# Patient Record
Sex: FEMALE | Race: WHITE | Employment: OTHER
[De-identification: names, ages, dates, MRNs, and addresses within clinical notes are randomized per-mention and may not be internally consistent; named-entity substitution may affect disease eponyms.]

---

## 2017-03-02 ENCOUNTER — OFFICE VISIT (OUTPATIENT)
Dept: FAMILY MEDICINE CLINIC | Facility: CLINIC | Age: 82
End: 2017-03-02

## 2017-03-02 VITALS
DIASTOLIC BLOOD PRESSURE: 84 MMHG | WEIGHT: 140 LBS | SYSTOLIC BLOOD PRESSURE: 130 MMHG | BODY MASS INDEX: 23.84 KG/M2 | HEART RATE: 72 BPM

## 2017-03-02 DIAGNOSIS — M17.11 PRIMARY OSTEOARTHRITIS OF RIGHT KNEE: Primary | ICD-10-CM

## 2017-03-02 DIAGNOSIS — Z01.818 PREOP EXAMINATION: ICD-10-CM

## 2017-03-02 DIAGNOSIS — J06.9 UPPER RESPIRATORY TRACT INFECTION, UNSPECIFIED TYPE: ICD-10-CM

## 2017-03-02 PROCEDURE — 99214 OFFICE O/P EST MOD 30 MIN: CPT | Performed by: FAMILY MEDICINE

## 2017-03-02 RX ORDER — DIPHENHYDRAMINE HYDROCHLORIDE 25 MG/1
TABLET ORAL DAILY
COMMUNITY
End: 2017-09-28 | Stop reason: ALTCHOICE

## 2017-03-02 RX ORDER — CIPROFLOXACIN 250 MG/1
250 TABLET, FILM COATED ORAL 2 TIMES DAILY
Qty: 20 TABLET | Refills: 0 | Status: SHIPPED | OUTPATIENT
Start: 2017-03-02 | End: 2017-03-12

## 2017-03-31 ENCOUNTER — OFFICE VISIT (OUTPATIENT)
Dept: FAMILY MEDICINE CLINIC | Age: 82
End: 2017-03-31
Payer: COMMERCIAL

## 2017-03-31 VITALS
DIASTOLIC BLOOD PRESSURE: 80 MMHG | WEIGHT: 141 LBS | HEART RATE: 82 BPM | BODY MASS INDEX: 24.01 KG/M2 | SYSTOLIC BLOOD PRESSURE: 120 MMHG

## 2017-03-31 DIAGNOSIS — M17.11 PRIMARY OSTEOARTHRITIS OF RIGHT KNEE: Primary | ICD-10-CM

## 2017-03-31 DIAGNOSIS — Z96.651 HISTORY OF ARTHROPLASTY OF RIGHT KNEE: ICD-10-CM

## 2017-03-31 PROCEDURE — 99213 OFFICE O/P EST LOW 20 MIN: CPT | Performed by: FAMILY MEDICINE

## 2017-03-31 RX ORDER — FAMOTIDINE 20 MG/1
20 TABLET, FILM COATED ORAL 2 TIMES DAILY
COMMUNITY
End: 2017-09-28 | Stop reason: ALTCHOICE

## 2017-03-31 RX ORDER — ERGOCALCIFEROL (VITAMIN D2) 1250 MCG
50000 CAPSULE ORAL WEEKLY
COMMUNITY
End: 2018-05-31 | Stop reason: ALTCHOICE

## 2017-03-31 RX ORDER — FERROUS SULFATE 325(65) MG
325 TABLET ORAL
COMMUNITY
End: 2018-05-31 | Stop reason: ALTCHOICE

## 2017-03-31 RX ORDER — HYDROCODONE BITARTRATE AND ACETAMINOPHEN 5; 325 MG/1; MG/1
1 TABLET ORAL EVERY 6 HOURS PRN
COMMUNITY
End: 2017-09-28 | Stop reason: ALTCHOICE

## 2017-04-03 PROBLEM — Z96.651 HISTORY OF ARTHROPLASTY OF RIGHT KNEE: Status: ACTIVE | Noted: 2017-04-03

## 2017-04-25 RX ORDER — LEVOTHYROXINE SODIUM 0.03 MG/1
25 TABLET ORAL DAILY
Qty: 90 TABLET | Refills: 3 | Status: SHIPPED | OUTPATIENT
Start: 2017-04-25 | End: 2018-02-13 | Stop reason: SDUPTHER

## 2017-05-09 RX ORDER — ALPRAZOLAM 0.25 MG/1
TABLET ORAL
Qty: 30 TABLET | Refills: 5 | Status: SHIPPED | OUTPATIENT
Start: 2017-05-09 | End: 2017-12-12 | Stop reason: SDUPTHER

## 2017-05-10 ENCOUNTER — TELEPHONE (OUTPATIENT)
Dept: FAMILY MEDICINE CLINIC | Age: 82
End: 2017-05-10

## 2017-09-28 ENCOUNTER — OFFICE VISIT (OUTPATIENT)
Dept: FAMILY MEDICINE CLINIC | Age: 82
End: 2017-09-28
Payer: COMMERCIAL

## 2017-09-28 ENCOUNTER — HOSPITAL ENCOUNTER (OUTPATIENT)
Age: 82
Setting detail: SPECIMEN
Discharge: HOME | End: 2017-09-28
Payer: COMMERCIAL

## 2017-09-28 VITALS
HEART RATE: 65 BPM | SYSTOLIC BLOOD PRESSURE: 120 MMHG | OXYGEN SATURATION: 97 % | BODY MASS INDEX: 23.39 KG/M2 | HEIGHT: 64 IN | DIASTOLIC BLOOD PRESSURE: 60 MMHG | WEIGHT: 137 LBS

## 2017-09-28 DIAGNOSIS — Z78.0 MENOPAUSE: Primary | ICD-10-CM

## 2017-09-28 DIAGNOSIS — D48.9 NEOPLASM, UNCERTAIN WHETHER BENIGN OR MALIGNANT: ICD-10-CM

## 2017-09-28 DIAGNOSIS — Z51.81 MEDICATION MONITORING ENCOUNTER: ICD-10-CM

## 2017-09-28 DIAGNOSIS — E03.9 HYPOTHYROIDISM, UNSPECIFIED TYPE: ICD-10-CM

## 2017-09-28 DIAGNOSIS — Z23 IMMUNIZATION DUE: ICD-10-CM

## 2017-09-28 DIAGNOSIS — M85.80 OSTEOPENIA, UNSPECIFIED LOCATION: ICD-10-CM

## 2017-09-28 DIAGNOSIS — E78.2 MIXED HYPERLIPIDEMIA: ICD-10-CM

## 2017-09-28 PROCEDURE — 11100 PR BIOPSY OF SKIN LESION: CPT | Performed by: FAMILY MEDICINE

## 2017-09-28 PROCEDURE — 90662 IIV NO PRSV INCREASED AG IM: CPT | Performed by: FAMILY MEDICINE

## 2017-09-28 PROCEDURE — G0008 ADMIN INFLUENZA VIRUS VAC: HCPCS | Performed by: FAMILY MEDICINE

## 2017-09-28 PROCEDURE — 99214 OFFICE O/P EST MOD 30 MIN: CPT | Performed by: FAMILY MEDICINE

## 2017-09-28 ASSESSMENT — ENCOUNTER SYMPTOMS
EYE ITCHING: 0
PHOTOPHOBIA: 0
VOICE CHANGE: 0
COLOR CHANGE: 1
ABDOMINAL PAIN: 0
COUGH: 0
RHINORRHEA: 0
SHORTNESS OF BREATH: 0
BACK PAIN: 0
WHEEZING: 0
ABDOMINAL DISTENTION: 0
VOMITING: 0
SINUS PRESSURE: 0
SORE THROAT: 0
CONSTIPATION: 0
BLOOD IN STOOL: 0
NAUSEA: 0
EYE PAIN: 0
CHEST TIGHTNESS: 0
FACIAL SWELLING: 0
EYE REDNESS: 0
EYE DISCHARGE: 0
DIARRHEA: 0

## 2017-09-28 ASSESSMENT — PATIENT HEALTH QUESTIONNAIRE - PHQ9
SUM OF ALL RESPONSES TO PHQ9 QUESTIONS 1 & 2: 0
SUM OF ALL RESPONSES TO PHQ QUESTIONS 1-9: 0
2. FEELING DOWN, DEPRESSED OR HOPELESS: 0
1. LITTLE INTEREST OR PLEASURE IN DOING THINGS: 0

## 2017-09-29 LAB
ALBUMIN SERPL-MCNC: 4.3 G/DL
ALP BLD-CCNC: 83 U/L
ALT SERPL-CCNC: 11 U/L
ANION GAP SERPL CALCULATED.3IONS-SCNC: 10 MMOL/L
AST SERPL-CCNC: 18 U/L
BILIRUB SERPL-MCNC: 0.5 MG/DL (ref 0.1–1.4)
BUN BLDV-MCNC: 17 MG/DL
CALCIUM SERPL-MCNC: 9.5 MG/DL
CHLORIDE BLD-SCNC: 104 MMOL/L
CHOLESTEROL, TOTAL: 201 MG/DL
CHOLESTEROL/HDL RATIO: 3
CO2: 27 MMOL/L
CREAT SERPL-MCNC: 0.69 MG/DL
GFR CALCULATED: >60
GLUCOSE BLD-MCNC: 85 MG/DL
HDLC SERPL-MCNC: 67 MG/DL (ref 35–70)
LDL CHOLESTEROL CALCULATED: 102 MG/DL (ref 0–160)
POTASSIUM SERPL-SCNC: 4.1 MMOL/L
SODIUM BLD-SCNC: 141 MMOL/L
TOTAL PROTEIN: 7
TRIGL SERPL-MCNC: 162 MG/DL
TSH SERPL DL<=0.05 MIU/L-ACNC: 1.13 UIU/ML
VLDLC SERPL CALC-MCNC: 32 MG/DL

## 2017-10-02 LAB — DERMATOLOGY PATHOLOGY REPORT: NORMAL

## 2017-10-04 DIAGNOSIS — Z51.81 MEDICATION MONITORING ENCOUNTER: ICD-10-CM

## 2017-10-04 DIAGNOSIS — E03.9 HYPOTHYROIDISM, UNSPECIFIED TYPE: ICD-10-CM

## 2017-10-04 DIAGNOSIS — E78.2 MIXED HYPERLIPIDEMIA: ICD-10-CM

## 2017-10-17 ENCOUNTER — NURSE ONLY (OUTPATIENT)
Dept: FAMILY MEDICINE CLINIC | Age: 82
End: 2017-10-17
Payer: COMMERCIAL

## 2017-10-17 DIAGNOSIS — Z23 IMMUNIZATION DUE: Primary | ICD-10-CM

## 2017-10-17 PROCEDURE — G0009 ADMIN PNEUMOCOCCAL VACCINE: HCPCS | Performed by: FAMILY MEDICINE

## 2017-10-17 PROCEDURE — 90670 PCV13 VACCINE IM: CPT | Performed by: FAMILY MEDICINE

## 2017-10-19 ENCOUNTER — TELEPHONE (OUTPATIENT)
Dept: FAMILY MEDICINE CLINIC | Age: 82
End: 2017-10-19

## 2017-10-20 RX ORDER — IBANDRONATE SODIUM 150 MG/1
150 TABLET, FILM COATED ORAL
Qty: 3 TABLET | Refills: 3 | Status: SHIPPED | OUTPATIENT
Start: 2017-10-20 | End: 2018-05-31 | Stop reason: SINTOL

## 2017-12-12 RX ORDER — ALPRAZOLAM 0.25 MG/1
TABLET ORAL
Qty: 30 TABLET | Refills: 5 | Status: SHIPPED | OUTPATIENT
Start: 2017-12-12 | End: 2018-07-03 | Stop reason: SDUPTHER

## 2018-02-13 RX ORDER — LEVOTHYROXINE SODIUM 0.03 MG/1
25 TABLET ORAL DAILY
Qty: 90 TABLET | Refills: 3 | Status: SHIPPED | OUTPATIENT
Start: 2018-02-13 | End: 2018-02-22 | Stop reason: SDUPTHER

## 2018-02-26 RX ORDER — LEVOTHYROXINE SODIUM 0.03 MG/1
25 TABLET ORAL DAILY
Qty: 90 TABLET | Refills: 3 | Status: SHIPPED | OUTPATIENT
Start: 2018-02-26 | End: 2019-02-20

## 2018-05-31 ENCOUNTER — OFFICE VISIT (OUTPATIENT)
Dept: FAMILY MEDICINE CLINIC | Age: 83
End: 2018-05-31
Payer: COMMERCIAL

## 2018-05-31 VITALS
WEIGHT: 141 LBS | BODY MASS INDEX: 24.2 KG/M2 | OXYGEN SATURATION: 96 % | HEART RATE: 84 BPM | SYSTOLIC BLOOD PRESSURE: 136 MMHG | DIASTOLIC BLOOD PRESSURE: 80 MMHG

## 2018-05-31 DIAGNOSIS — G89.29 CHRONIC RIGHT-SIDED LOW BACK PAIN WITH RIGHT-SIDED SCIATICA: Primary | ICD-10-CM

## 2018-05-31 DIAGNOSIS — M54.41 CHRONIC RIGHT-SIDED LOW BACK PAIN WITH RIGHT-SIDED SCIATICA: Primary | ICD-10-CM

## 2018-05-31 PROCEDURE — 99214 OFFICE O/P EST MOD 30 MIN: CPT | Performed by: FAMILY MEDICINE

## 2018-05-31 RX ORDER — DICLOFENAC SODIUM 75 MG/1
75 TABLET, DELAYED RELEASE ORAL 2 TIMES DAILY WITH MEALS
Qty: 60 TABLET | Refills: 11 | Status: SHIPPED | OUTPATIENT
Start: 2018-05-31 | End: 2020-01-20 | Stop reason: SDUPTHER

## 2018-07-03 ENCOUNTER — OFFICE VISIT (OUTPATIENT)
Dept: FAMILY MEDICINE CLINIC | Age: 83
End: 2018-07-03
Payer: COMMERCIAL

## 2018-07-03 VITALS
HEART RATE: 87 BPM | OXYGEN SATURATION: 97 % | WEIGHT: 143.2 LBS | BODY MASS INDEX: 24.58 KG/M2 | DIASTOLIC BLOOD PRESSURE: 64 MMHG | SYSTOLIC BLOOD PRESSURE: 112 MMHG

## 2018-07-03 DIAGNOSIS — F41.1 GAD (GENERALIZED ANXIETY DISORDER): ICD-10-CM

## 2018-07-03 DIAGNOSIS — I10 ESSENTIAL HYPERTENSION: ICD-10-CM

## 2018-07-03 DIAGNOSIS — G89.29 CHRONIC BILATERAL LOW BACK PAIN WITHOUT SCIATICA: Primary | ICD-10-CM

## 2018-07-03 DIAGNOSIS — Z51.81 MEDICATION MONITORING ENCOUNTER: ICD-10-CM

## 2018-07-03 DIAGNOSIS — M54.50 CHRONIC BILATERAL LOW BACK PAIN WITHOUT SCIATICA: Primary | ICD-10-CM

## 2018-07-03 DIAGNOSIS — E03.1 CONGENITAL HYPOTHYROIDISM WITHOUT GOITER: ICD-10-CM

## 2018-07-03 DIAGNOSIS — E78.2 MIXED HYPERLIPIDEMIA: ICD-10-CM

## 2018-07-03 PROCEDURE — 99214 OFFICE O/P EST MOD 30 MIN: CPT | Performed by: FAMILY MEDICINE

## 2018-07-03 RX ORDER — ALPRAZOLAM 0.25 MG/1
TABLET ORAL
Qty: 90 TABLET | Refills: 1 | Status: SHIPPED | OUTPATIENT
Start: 2018-07-03 | End: 2019-03-08 | Stop reason: SDUPTHER

## 2018-07-03 ASSESSMENT — ENCOUNTER SYMPTOMS
WHEEZING: 0
FACIAL SWELLING: 0
BLOOD IN STOOL: 0
SORE THROAT: 0
CONSTIPATION: 0
EYE DISCHARGE: 0
PHOTOPHOBIA: 0
RHINORRHEA: 0
EYE PAIN: 0
CHEST TIGHTNESS: 0
SHORTNESS OF BREATH: 0
COLOR CHANGE: 0
EYE REDNESS: 0
SINUS PRESSURE: 0
DIARRHEA: 0
VOMITING: 0
COUGH: 0
EYE ITCHING: 0
ABDOMINAL PAIN: 0
BACK PAIN: 0
VOICE CHANGE: 0
NAUSEA: 0
ABDOMINAL DISTENTION: 0

## 2018-07-03 NOTE — PROGRESS NOTES
Subjective:      Patient ID: Cheryl Abraham is a 80 y.o. female. Visit Information    Have you changed or started any medications since your last visit including any over-the-counter medicines, vitamins, or herbal medicines? no   Are you having any side effects from any of your medications? -  no  Have you stopped taking any of your medications? Is so, why? -  no    Have you seen any other physician or provider since your last visit? No  Have you had any other diagnostic tests since your last visit? No  Have you been seen in the emergency room and/or had an admission to a hospital since we last saw you? No  Have you had your routine dental cleaning in the past 6 months? yes -     Have you activated your [employer] account? If not, what are your barriers?  No:      Patient Care Team:  María Go MD as PCP - General (Family Medicine)  Tabitha Gruber MD as Consulting Physician (Cardiology)  Juan Manuel Ng DO as Consulting Physician (Orthopedic Surgery)    Medical History Review  Past Medical, Family, and Social History reviewed and  contribute to the patient presenting condition    Health Maintenance   Topic Date Due    DTaP/Tdap/Td vaccine (1 - Tdap) [birth year]02/1951    Shingles Vaccine (1 of 2 - 2 Dose Series) 04/02/1982    Flu vaccine (1) 09/01/2018    TSH testing  09/29/2018    Potassium monitoring  09/29/2018    Creatinine monitoring  09/29/2018    Pneumococcal low/med risk  Completed       HPI    Review of Systems    Objective:   Physical Exam    Assessment / Plan:

## 2018-07-03 NOTE — PROGRESS NOTES
noted. She is not diaphoretic. No cyanosis. Nails show no clubbing. Psychiatric: She has a normal mood and affect. Her speech is normal and behavior is normal. Judgment and thought content normal. Cognition and memory are normal.     Vitals:    07/03/18 1327   BP: 112/64   Pulse: 87   SpO2: 97%   Weight: 143 lb 3.2 oz (65 kg)         Assessment:          Plan:      1. Essential hypertension  - Discussed the role of hypertension in development of other disease courses such as CHF and atherosclerosis. - Encouraged patient to avoid sodium in the diet and reminded that the majority of sodium comes from packaged foods in cans and boxes which should be avoided where possible. - Encouraged good hydration and avoidance of caffeine where possible. - Discussed goals for blood pressure monitoring which should be 130/80.     - Comprehensive Metabolic Panel; Future    2. Mixed hyperlipidemia  - Discussed the role of statins in the control of lipids and encouraged a low fat, low carbohydrate diet rich in vegetables and plant matter. - Discussed the side effects of lipid lowering medications which include but are not limited to myalgia especially in the larger muscle groups such as the thighs, back, and shoulders and suggested starting a 300mg CoQ10 supplement if these become problematic or contacting the office if they are intolerable or otherwise not controlled. - Discussed the goal for lipid lowering as an LDL less than 100. - Lipid Panel; Future    3. Congenital hypothyroidism without goiter  - TSH With Reflex Ft4; Future    4. Chronic bilateral low back pain without sciatica  Has been improving and would like to continue with home PT before she progresses any further with additional treatments. 5. JADE (generalized anxiety disorder)  - ALPRAZolam (XANAX) 0.25 MG tablet; TAKE 1 TABLET BY MOUTH DAILY. Dispense: 90 tablet; Refill: 1    6.  Medication monitoring encounter  - CBC With Auto Differential; Future

## 2018-07-20 LAB
ALBUMIN SERPL-MCNC: 4.2 G/DL
ALP BLD-CCNC: 60 U/L
ALT SERPL-CCNC: 97 U/L
ANION GAP SERPL CALCULATED.3IONS-SCNC: NORMAL MMOL/L
AST SERPL-CCNC: 64 U/L
BASOPHILS ABSOLUTE: NORMAL /ΜL
BASOPHILS RELATIVE PERCENT: NORMAL %
BILIRUB SERPL-MCNC: 0.5 MG/DL (ref 0.1–1.4)
BUN BLDV-MCNC: 18 MG/DL
CALCIUM SERPL-MCNC: 9.8 MG/DL
CHLORIDE BLD-SCNC: 105 MMOL/L
CHOLESTEROL, TOTAL: 208 MG/DL
CHOLESTEROL/HDL RATIO: 3.3
CO2: 24 MMOL/L
CREAT SERPL-MCNC: 0.91 MG/DL
EOSINOPHILS ABSOLUTE: NORMAL /ΜL
EOSINOPHILS RELATIVE PERCENT: NORMAL %
GFR CALCULATED: NORMAL
GLUCOSE BLD-MCNC: 89 MG/DL
HCT VFR BLD CALC: 37 % (ref 36–46)
HDLC SERPL-MCNC: 63 MG/DL (ref 35–70)
HEMOGLOBIN: 12.6 G/DL (ref 12–16)
LDL CHOLESTEROL CALCULATED: 111 MG/DL (ref 0–160)
LYMPHOCYTES ABSOLUTE: NORMAL /ΜL
LYMPHOCYTES RELATIVE PERCENT: NORMAL %
MCH RBC QN AUTO: 29 PG
MCHC RBC AUTO-ENTMCNC: 34.1 G/DL
MCV RBC AUTO: 85 FL
MONOCYTES ABSOLUTE: NORMAL /ΜL
MONOCYTES RELATIVE PERCENT: NORMAL %
NEUTROPHILS ABSOLUTE: NORMAL /ΜL
NEUTROPHILS RELATIVE PERCENT: NORMAL %
PDW BLD-RTO: 13.8 %
PLATELET # BLD: 272 K/ΜL
PMV BLD AUTO: 7.5 FL
POTASSIUM SERPL-SCNC: 4.8 MMOL/L
RBC # BLD: 4.35 10^6/ΜL
SODIUM BLD-SCNC: 139 MMOL/L
TOTAL PROTEIN: 7.1
TRIGL SERPL-MCNC: 171 MG/DL
TSH SERPL DL<=0.05 MIU/L-ACNC: 0.33 UIU/ML
VLDLC SERPL CALC-MCNC: NORMAL MG/DL
WBC # BLD: 6.1 10^3/ML

## 2018-07-31 DIAGNOSIS — E03.1 CONGENITAL HYPOTHYROIDISM WITHOUT GOITER: ICD-10-CM

## 2018-07-31 DIAGNOSIS — I10 ESSENTIAL HYPERTENSION: ICD-10-CM

## 2018-07-31 DIAGNOSIS — Z51.81 MEDICATION MONITORING ENCOUNTER: ICD-10-CM

## 2018-07-31 DIAGNOSIS — E78.2 MIXED HYPERLIPIDEMIA: ICD-10-CM

## 2018-10-04 ENCOUNTER — IMMUNIZATION (OUTPATIENT)
Dept: FAMILY MEDICINE CLINIC | Age: 83
End: 2018-10-04
Payer: COMMERCIAL

## 2018-10-04 PROCEDURE — G0008 ADMIN INFLUENZA VIRUS VAC: HCPCS | Performed by: FAMILY MEDICINE

## 2018-10-04 PROCEDURE — 90662 IIV NO PRSV INCREASED AG IM: CPT | Performed by: FAMILY MEDICINE

## 2019-02-15 DIAGNOSIS — Z96.651 HISTORY OF ARTHROPLASTY OF RIGHT KNEE: Primary | ICD-10-CM

## 2019-02-18 ENCOUNTER — OFFICE VISIT (OUTPATIENT)
Dept: ORTHOPEDIC SURGERY | Age: 84
End: 2019-02-18
Payer: COMMERCIAL

## 2019-02-18 VITALS
HEIGHT: 64 IN | DIASTOLIC BLOOD PRESSURE: 80 MMHG | SYSTOLIC BLOOD PRESSURE: 141 MMHG | HEART RATE: 75 BPM | BODY MASS INDEX: 23.9 KG/M2 | WEIGHT: 140 LBS

## 2019-02-18 DIAGNOSIS — Z96.659 HISTORY OF TOTAL KNEE REPLACEMENT, UNSPECIFIED LATERALITY: Primary | ICD-10-CM

## 2019-02-18 PROCEDURE — 99213 OFFICE O/P EST LOW 20 MIN: CPT | Performed by: ORTHOPAEDIC SURGERY

## 2019-02-18 RX ORDER — ROSUVASTATIN CALCIUM 10 MG/1
10 TABLET, COATED ORAL DAILY
COMMUNITY
End: 2020-02-25

## 2019-02-18 ASSESSMENT — ENCOUNTER SYMPTOMS
ABDOMINAL PAIN: 0
APNEA: 0
ABDOMINAL DISTENTION: 0
NAUSEA: 0
CONSTIPATION: 0
COUGH: 0
DIARRHEA: 0
COLOR CHANGE: 0
VOMITING: 0
CHEST TIGHTNESS: 0
SHORTNESS OF BREATH: 0

## 2019-02-20 RX ORDER — LEVOTHYROXINE SODIUM 0.03 MG/1
TABLET ORAL
Qty: 90 TABLET | Refills: 3 | Status: SHIPPED | OUTPATIENT
Start: 2019-02-20 | End: 2019-04-16 | Stop reason: SDUPTHER

## 2019-02-21 ENCOUNTER — TELEPHONE (OUTPATIENT)
Dept: ORTHOPEDIC SURGERY | Age: 84
End: 2019-02-21

## 2019-02-21 DIAGNOSIS — M17.11 PRIMARY OSTEOARTHRITIS OF RIGHT KNEE: Primary | ICD-10-CM

## 2019-02-21 DIAGNOSIS — Z96.659 HISTORY OF TOTAL KNEE REPLACEMENT, UNSPECIFIED LATERALITY: ICD-10-CM

## 2019-02-21 DIAGNOSIS — M25.561 ACUTE PAIN OF RIGHT KNEE: ICD-10-CM

## 2019-02-21 DIAGNOSIS — Z96.651 HISTORY OF ARTHROPLASTY OF RIGHT KNEE: ICD-10-CM

## 2019-03-01 ENCOUNTER — TELEPHONE (OUTPATIENT)
Dept: ORTHOPEDIC SURGERY | Age: 84
End: 2019-03-01

## 2019-03-08 DIAGNOSIS — F41.1 GAD (GENERALIZED ANXIETY DISORDER): ICD-10-CM

## 2019-03-08 RX ORDER — ALPRAZOLAM 0.25 MG/1
TABLET ORAL
Qty: 90 TABLET | Refills: 1 | Status: SHIPPED | OUTPATIENT
Start: 2019-03-08 | End: 2019-07-08 | Stop reason: SDUPTHER

## 2019-03-12 ENCOUNTER — OFFICE VISIT (OUTPATIENT)
Dept: ORTHOPEDIC SURGERY | Age: 84
End: 2019-03-12
Payer: COMMERCIAL

## 2019-03-12 VITALS — WEIGHT: 140 LBS | BODY MASS INDEX: 23.9 KG/M2 | HEIGHT: 64 IN

## 2019-03-12 DIAGNOSIS — T84.038A MECHANICAL LOOSENING OF PROSTHETIC KNEE, INITIAL ENCOUNTER (HCC): Primary | ICD-10-CM

## 2019-03-12 DIAGNOSIS — Z96.651 HISTORY OF ARTHROPLASTY OF RIGHT KNEE: ICD-10-CM

## 2019-03-12 DIAGNOSIS — Z96.659 MECHANICAL LOOSENING OF PROSTHETIC KNEE, INITIAL ENCOUNTER (HCC): Primary | ICD-10-CM

## 2019-03-12 PROCEDURE — 99213 OFFICE O/P EST LOW 20 MIN: CPT | Performed by: PHYSICIAN ASSISTANT

## 2019-03-12 ASSESSMENT — ENCOUNTER SYMPTOMS
VOMITING: 0
COUGH: 0
ABDOMINAL PAIN: 0
NAUSEA: 0
APNEA: 0
RESPIRATORY NEGATIVE: 1
SHORTNESS OF BREATH: 0
CHEST TIGHTNESS: 0
CONSTIPATION: 0
DIARRHEA: 0
COLOR CHANGE: 0
ABDOMINAL DISTENTION: 0

## 2019-04-16 ENCOUNTER — OFFICE VISIT (OUTPATIENT)
Dept: FAMILY MEDICINE CLINIC | Age: 84
End: 2019-04-16
Payer: COMMERCIAL

## 2019-04-16 VITALS
HEART RATE: 74 BPM | DIASTOLIC BLOOD PRESSURE: 76 MMHG | WEIGHT: 139 LBS | OXYGEN SATURATION: 97 % | HEIGHT: 64 IN | SYSTOLIC BLOOD PRESSURE: 118 MMHG | BODY MASS INDEX: 23.73 KG/M2

## 2019-04-16 DIAGNOSIS — Z00.00 MEDICARE ANNUAL WELLNESS VISIT, SUBSEQUENT: Primary | ICD-10-CM

## 2019-04-16 DIAGNOSIS — E03.9 HYPOTHYROIDISM, UNSPECIFIED TYPE: ICD-10-CM

## 2019-04-16 DIAGNOSIS — Z00.00 ROUTINE GENERAL MEDICAL EXAMINATION AT A HEALTH CARE FACILITY: ICD-10-CM

## 2019-04-16 DIAGNOSIS — G89.29 CHRONIC PAIN OF RIGHT KNEE: ICD-10-CM

## 2019-04-16 DIAGNOSIS — M25.561 CHRONIC PAIN OF RIGHT KNEE: ICD-10-CM

## 2019-04-16 PROCEDURE — 96372 THER/PROPH/DIAG INJ SC/IM: CPT | Performed by: FAMILY MEDICINE

## 2019-04-16 PROCEDURE — G0439 PPPS, SUBSEQ VISIT: HCPCS | Performed by: FAMILY MEDICINE

## 2019-04-16 RX ORDER — LEVOTHYROXINE SODIUM 0.03 MG/1
TABLET ORAL
Qty: 90 TABLET | Refills: 3 | Status: SHIPPED | OUTPATIENT
Start: 2019-04-16

## 2019-04-16 RX ORDER — IBUPROFEN AND FAMOTIDINE 26.6; 8 MG/1; MG/1
1 TABLET, FILM COATED ORAL 3 TIMES DAILY
Qty: 90 TABLET | Refills: 5 | COMMUNITY
Start: 2019-04-16

## 2019-04-16 RX ORDER — ACETAMINOPHEN AND CODEINE PHOSPHATE 300; 30 MG/1; MG/1
1-2 TABLET ORAL EVERY 6 HOURS PRN
Qty: 50 TABLET | Refills: 0 | Status: SHIPPED | OUTPATIENT
Start: 2019-04-16 | End: 2019-04-23

## 2019-04-16 RX ORDER — TRIAMCINOLONE ACETONIDE 40 MG/ML
120 INJECTION, SUSPENSION INTRA-ARTICULAR; INTRAMUSCULAR ONCE
Status: COMPLETED | OUTPATIENT
Start: 2019-04-16 | End: 2019-04-16

## 2019-04-16 RX ADMIN — TRIAMCINOLONE ACETONIDE 120 MG: 40 INJECTION, SUSPENSION INTRA-ARTICULAR; INTRAMUSCULAR at 17:28

## 2019-04-16 ASSESSMENT — ENCOUNTER SYMPTOMS
SORE THROAT: 0
WHEEZING: 0
CHEST TIGHTNESS: 0
SINUS PRESSURE: 0
COUGH: 0
BACK PAIN: 1
NAUSEA: 0
EYE PAIN: 1
EYE ITCHING: 0
FACIAL SWELLING: 0
VOMITING: 0
ABDOMINAL PAIN: 0
DIARRHEA: 0
BLOOD IN STOOL: 0
EYE REDNESS: 1
COLOR CHANGE: 0
RHINORRHEA: 0
ABDOMINAL DISTENTION: 0
VOICE CHANGE: 0
SHORTNESS OF BREATH: 0
PHOTOPHOBIA: 0
CONSTIPATION: 1
EYE DISCHARGE: 0

## 2019-04-16 ASSESSMENT — ANXIETY QUESTIONNAIRES: GAD7 TOTAL SCORE: 1

## 2019-04-16 ASSESSMENT — PATIENT HEALTH QUESTIONNAIRE - PHQ9
SUM OF ALL RESPONSES TO PHQ QUESTIONS 1-9: 2
SUM OF ALL RESPONSES TO PHQ QUESTIONS 1-9: 2

## 2019-04-16 ASSESSMENT — LIFESTYLE VARIABLES: HOW OFTEN DO YOU HAVE A DRINK CONTAINING ALCOHOL: 0

## 2019-04-16 NOTE — PROGRESS NOTES
Subjective:      Patient ID: Tucker Hogan is a 80 y.o. female. Visit Information    Have you changed or started any medications since your last visit including any over-the-counter medicines, vitamins, or herbal medicines? no   Are you having any side effects from any of your medications? -  no  Have you stopped taking any of your medications? Is so, why? -  no    Have you seen any other physician or provider since your last visit? Yes - Records Obtained  Have you had any other diagnostic tests since your last visit? No  Have you been seen in the emergency room and/or had an admission to a hospital since we last saw you? No  Have you had your routine dental cleaning in the past 6 months? yes -     Have you activated your [employer] account? If not, what are your barriers?  No:      Patient Care Team:  Giovanny Doe MD as PCP - General (Family Medicine)  Lance Aceves MD as Consulting Physician (Cardiology)  Army Toribio DO as Consulting Physician (Orthopedic Surgery)    Medical History Review  Past Medical, Family, and Social History reviewed and  contribute to the patient presenting condition    Health Maintenance   Topic Date Due    DTaP/Tdap/Td vaccine (1 - Tdap) 04/02/1951    Shingles Vaccine (1 of 2) 04/02/1982    TSH testing  07/20/2019    Potassium monitoring  07/20/2019    Creatinine monitoring  07/20/2019    Flu vaccine  Completed    Pneumococcal 65+ years Vaccine  Completed       HPI    Review of Systems    Objective:   Physical Exam    Assessment / Plan:

## 2019-04-16 NOTE — PATIENT INSTRUCTIONS
Personalized Preventive Plan for Debbie Prader - 4/16/2019  Medicare offers a range of preventive health benefits. Some of the tests and screenings are paid in full while other may be subject to a deductible, co-insurance, and/or copay. Some of these benefits include a comprehensive review of your medical history including lifestyle, illnesses that may run in your family, and various assessments and screenings as appropriate. After reviewing your medical record and screening and assessments performed today your provider may have ordered immunizations, labs, imaging, and/or referrals for you. A list of these orders (if applicable) as well as your Preventive Care list are included within your After Visit Summary for your review. Other Preventive Recommendations:    · A preventive eye exam performed by an eye specialist is recommended every 1-2 years to screen for glaucoma; cataracts, macular degeneration, and other eye disorders. · A preventive dental visit is recommended every 6 months. · Try to get at least 150 minutes of exercise per week or 10,000 steps per day on a pedometer . · Order or download the FREE \"Exercise & Physical Activity: Your Everyday Guide\" from [employer]. Call 9-955.644.4381 or search [employer] online. · You need 3634-0127 mg of calcium and 5980-8159 IU of vitamin D per day. It is possible to meet your calcium requirement with diet alone, but a vitamin D supplement is usually necessary to meet this goal.  · When exposed to the sun, use a sunscreen that protects against both UVA and UVB radiation with an SPF of 30 or greater. Reapply every 2 to 3 hours or after sweating, drying off with a towel, or swimming. · Always wear a seat belt when traveling in a car. Always wear a helmet when riding a bicycle or motorcycle.

## 2019-04-16 NOTE — PROGRESS NOTES
Subjective:      Patient ID: Perez Jane is a 80 y.o. female. HPI  Here for medicare annual well check and has been having a lot of difficulty with lumbar radicular pain and there has been some instability and pain in the right knee because of some loss of the cement stability on her TKA. Review of Systems   Constitutional: Negative for activity change, appetite change, chills, diaphoresis, fatigue, fever and unexpected weight change. HENT: Negative for congestion, ear pain, facial swelling, hearing loss, postnasal drip, rhinorrhea, sinus pressure, sore throat and voice change. Eyes: Positive for pain and redness. Negative for photophobia, discharge, itching and visual disturbance. Respiratory: Negative for cough, chest tightness, shortness of breath and wheezing. Cardiovascular: Negative for chest pain and palpitations. Gastrointestinal: Positive for constipation. Negative for abdominal distention, abdominal pain, blood in stool, diarrhea, nausea and vomiting. Endocrine: Negative for cold intolerance and heat intolerance. Genitourinary: Negative for decreased urine volume, difficulty urinating, dysuria, flank pain, frequency, hematuria, pelvic pain, urgency, vaginal bleeding and vaginal discharge. Musculoskeletal: Positive for arthralgias (right knee ), back pain and gait problem. Negative for joint swelling, myalgias, neck pain and neck stiffness. Skin: Negative for color change, pallor and rash. Allergic/Immunologic: Negative for environmental allergies and food allergies. Neurological: Negative for dizziness, tremors, seizures, syncope, facial asymmetry, speech difficulty, weakness, numbness and headaches. Psychiatric/Behavioral: Negative for agitation, behavioral problems, dysphoric mood and sleep disturbance. The patient is not nervous/anxious and is not hyperactive. Objective:   Physical Exam   Constitutional: She is oriented to person, place, and time.  She appears well-developed and well-nourished. She does not appear ill. No distress. HENT:   Head: Normocephalic and atraumatic. Right Ear: External ear normal.   Left Ear: External ear normal.   Nose: Nose normal. No mucosal edema or rhinorrhea. Mouth/Throat: Mucous membranes are normal. No oropharyngeal exudate, posterior oropharyngeal edema or posterior oropharyngeal erythema. Eyes: Pupils are equal, round, and reactive to light. EOM are normal. Right eye exhibits no discharge. Left eye exhibits no discharge. No scleral icterus. Neck: Trachea normal and normal range of motion. Neck supple. No JVD present. Carotid bruit is not present. No tracheal deviation present. No thyromegaly present. Cardiovascular: Normal rate, regular rhythm, S1 normal, S2 normal, normal heart sounds and normal pulses. Exam reveals no gallop, no S3, no S4, no distant heart sounds and no friction rub. No murmur heard. Pulmonary/Chest: No respiratory distress. She has no decreased breath sounds. She has no wheezes. She has no rhonchi. She has no rales. Abdominal: Soft. Normal appearance and bowel sounds are normal. There is no hepatosplenomegaly. There is no tenderness. There is no CVA tenderness. No hernia. Musculoskeletal:        Right knee: She exhibits decreased range of motion, swelling and abnormal patellar mobility. She exhibits no effusion, no ecchymosis, no deformity, no laceration, no erythema, normal alignment and no LCL laxity. Back:         Legs:  Lymphadenopathy:     She has no cervical adenopathy. Right cervical: No superficial cervical adenopathy present. Left cervical: No superficial cervical adenopathy present. Neurological: She is alert and oriented to person, place, and time. She has normal strength. No cranial nerve deficit or sensory deficit. Coordination and gait normal.   Reflex Scores:       Brachioradialis reflexes are 2+ on the right side and 2+ on the left side.        Patellar reflexes are 2+ on the right side and 2+ on the left side. Achilles reflexes are 2+ on the right side and 2+ on the left side. Skin: Skin is warm and dry. No lesion and no rash noted. She is not diaphoretic. No cyanosis. Nails show no clubbing. Psychiatric: She has a normal mood and affect. Her speech is normal and behavior is normal. Judgment and thought content normal. Cognition and memory are normal.       Assessment / Plan:           Medicare Annual Wellness Visit  Name: Samanta Client Date: 2019   MRN: [id number] Sex: Female   Age: 80 y.o. Ethnicity: Non-/Non    : 1932 Race: Allyson Rodriguez is here for Medicare AWV and Eye Problem (right )    Screenings for behavioral, psychosocial and functional/safety risks, and cognitive dysfunction are all negative except as indicated below. These results, as well as other patient data from the [address] form, are documented in Flowsheets linked to this Encounter. Allergies   Allergen Reactions    Penicillins Nausea Only    Bactrim [Sulfamethoxazole-Trimethoprim] Rash    Sulfa Antibiotics Rash     Prior to Visit Medications    Medication Sig Taking? Authorizing Provider   levothyroxine (SYNTHROID) 25 MCG tablet TAKE ONE TABLET BY MOUTH ONE TIME A DAY Yes James Abel MD   acetaminophen-codeine (TYLENOL #3) 300-30 MG per tablet Take 1-2 tablets by mouth every 6 hours as needed for Pain for up to 7 days.  Yes James Abel MD   ibuprofen-famotidine (DUEXIS) 800-26.6 MG TABS Take 1 tablet by mouth 3 times daily Yes James Abel MD   rosuvastatin (CRESTOR) 10 MG tablet Take 10 mg by mouth daily Yes Historical Provider, MD   diclofenac (VOLTAREN) 75 MG EC tablet Take 1 tablet by mouth 2 times daily (with meals) Yes Lilia Sher MD   tobramycin (TOBREX) 0.3 % ophthalmic solution 2 drops each eye TID Yes Farhana Minaya MD   lisinopril (PRINIVIL;ZESTRIL) 5 MG tablet Take 5 mg by mouth 2 times daily. Yes Historical Provider, MD   aspirin 81 MG tablet Take 81 mg by mouth daily. Yes Historical Provider, MD   multivitamin [facility]) per tablet Take 1 tablet by mouth daily. Yes Historical Provider, MD     No past medical history on file. No past surgical history on file. No family history on file. CareTeam (Including outside providers/suppliers regularly involved in providing care):   Patient Care Team:  Adolfo Bosworth, MD as PCP - General (Family Medicine)  Micaela Pisano MD as Consulting Physician (Cardiology)  Iván Shore DO as Consulting Physician (Orthopedic Surgery)    Wt Readings from Last 3 Encounters:   04/16/19 139 lb (63 kg)   03/12/19 140 lb (63.5 kg)   02/18/19 140 lb (63.5 kg)     Vitals:    04/16/19 1404   BP: 118/76   Pulse: 74   SpO2: 97%   Weight: 139 lb (63 kg)   Height: 5' 4\" (1.626 m)     Body mass index is 23.86 kg/m². Based upon direct observation of the patient, evaluation of cognition reveals recent and remote memory intact.     General Appearance: alert and oriented to person, place and time, well developed and well- nourished, in no acute distress  Skin: warm and dry, no rash or erythema  Head: normocephalic and atraumatic  Eyes: pupils equal, round, and reactive to light, extraocular eye movements intact, conjunctivae normal  ENT: tympanic membrane, external ear and ear canal normal bilaterally, nose without deformity, nasal mucosa and turbinates normal without polyps  Neck: supple and non-tender without mass, no thyromegaly or thyroid nodules, no cervical lymphadenopathy  Pulmonary/Chest: clear to auscultation bilaterally- no wheezes, rales or rhonchi, normal air movement, no respiratory distress  Cardiovascular: normal rate, regular rhythm, normal S1 and S2, no murmurs, rubs, clicks, or gallops, distal pulses intact, no carotid bruits  Abdomen: soft, non-tender, non-distended, normal bowel sounds, no masses or organomegaly  Extremities: no cyanosis, clubbing or edema  Musculoskeletal: normal range of motion, no joint swelling, deformity or tenderness  Neurologic: reflexes normal and symmetric, no cranial nerve deficit, gait, coordination and speech normal    Patient's complete Health Risk Assessment and screening values have been reviewed and are found in Flowsheets. The following problems were reviewed today and where indicated follow up appointments were made and/or referrals ordered. Positive Risk Factor Screenings with Interventions:     General Health:  General  In general, how would you say your health is?: Good  In the past 7 days, have you experienced any of the following?  New or Increased Pain, New or Increased Fatigue, Loneliness, Social Isolation, Stress or Anger?: (!) Stress  Do you get the social and emotional support that you need?: Yes  Do you have a Living Will?: Yes  General Health Risk Interventions:  · Pain issues: home exercises provided    Hearing/Vision:  Hearing/Vision  Do you or your family notice any trouble with your hearing?: No  Do you have difficulty driving, watching TV, or doing any of your daily activities because of your eyesight?: No  Have you had an eye exam within the past year?: (!) No  Hearing/Vision Interventions:  · no hearing or vision concerns     Safety:  Safety  Do you have working smoke detectors?: Yes  Have all throw rugs been removed or fastened?: (!) No  Do you have non-slip mats in all bathtubs?: Yes  Do all of your stairways have a railing or banister?: (!) No  Are your doorways, halls and stairs free of clutter?: (!) No  Do you always fasten your seatbelt when you are in a car?: Yes  Safety Interventions:  · Home safety tips provided    Personalized Preventive Plan   Current Health Maintenance Status  Immunization History   Administered Date(s) Administered    Influenza Vaccine, unspecified formulation 11/02/2016    Influenza, High Dose (Fluzone 65 yrs and older) 09/28/2017, 10/04/2018    Pneumococcal 13-valent Conjugate ([id number]) 10/17/2017    Pneumococcal Polysaccharide ([id number]) 03/10/2014        Health Maintenance   Topic Date Due    DTaP/Tdap/Td vaccine (1 - Tdap) 04[birth year]02/1951    Shingles Vaccine (1 of 2) 04/02/1982    TSH testing  07/20/2019    Potassium monitoring  07/20/2019    Creatinine monitoring  07/20/2019    Flu vaccine  Completed    Pneumococcal 65+ years Vaccine  Completed     Recommendations for Preventive Services Due: see orders and patient instructions/AVS.  .   Recommended screening schedule for the next 5-10 years is provided to the patient in written form: see Patient Instructions/AVS.

## 2019-07-03 ENCOUNTER — TELEPHONE (OUTPATIENT)
Dept: FAMILY MEDICINE CLINIC | Age: 84
End: 2019-07-03

## 2019-07-03 RX ORDER — FAMOTIDINE 20 MG/1
20 TABLET, FILM COATED ORAL 2 TIMES DAILY
Qty: 180 TABLET | Refills: 1 | Status: SHIPPED | OUTPATIENT
Start: 2019-07-03

## 2019-07-03 RX ORDER — IBUPROFEN 800 MG/1
800 TABLET ORAL EVERY 8 HOURS PRN
Qty: 270 TABLET | Refills: 1 | Status: SHIPPED | OUTPATIENT
Start: 2019-07-03

## 2019-07-08 DIAGNOSIS — F41.1 GAD (GENERALIZED ANXIETY DISORDER): ICD-10-CM

## 2019-07-08 RX ORDER — ALPRAZOLAM 0.25 MG/1
TABLET ORAL
Qty: 90 TABLET | Refills: 1 | Status: SHIPPED | OUTPATIENT
Start: 2019-07-08 | End: 2019-08-08

## 2019-07-09 ENCOUNTER — TELEPHONE (OUTPATIENT)
Dept: FAMILY MEDICINE CLINIC | Age: 84
End: 2019-07-09

## 2020-01-09 ENCOUNTER — OFFICE VISIT (OUTPATIENT)
Dept: ORTHOPEDIC SURGERY | Age: 85
End: 2020-01-09
Payer: COMMERCIAL

## 2020-01-09 VITALS
WEIGHT: 152 LBS | SYSTOLIC BLOOD PRESSURE: 180 MMHG | HEIGHT: 64 IN | BODY MASS INDEX: 25.95 KG/M2 | HEART RATE: 65 BPM | DIASTOLIC BLOOD PRESSURE: 81 MMHG

## 2020-01-09 PROCEDURE — 99214 OFFICE O/P EST MOD 30 MIN: CPT | Performed by: ORTHOPAEDIC SURGERY

## 2020-01-09 ASSESSMENT — ENCOUNTER SYMPTOMS
SHORTNESS OF BREATH: 0
APNEA: 0
CHEST TIGHTNESS: 0
VOMITING: 0
COUGH: 0
DIARRHEA: 0
NAUSEA: 0
ABDOMINAL DISTENTION: 0
COLOR CHANGE: 0
CONSTIPATION: 0
ABDOMINAL PAIN: 0

## 2020-01-09 NOTE — PROGRESS NOTES
[address] [facility]  [address] [place], [place] [id number]  Dept: [phone]  Dept Fax: [phone]          Bilateral Knee - Follow Up     Subjective:     Chief Complaint   Patient presents with    Knee Pain     Patient is here today for bilateral knee pain. Hx of right TKA in 2017. She states that she feels like her left knee is giving out at times. HPI:     [place] presents today for Bilateral knee pain. The pain has been present for 10 months since March of 2019. The patient recalls no specific injury but she states that she went to put her groceries in  car last February and someone said something to her and she went to turn to see who it was and when she tried to turn back she fell on both of her knees. She states the fall did not hurt but she does not know if the pain was from the fall. The patient has tried CBD oil, rest, marijuana and Voltaren with mild improvement. The pain is now described as Achy and Dull. There is pain on weight bearing. The knee has swelled in the past but they are not swollen today. There is not painful popping and clicking. The knee has not caught or locked up. The knee has not given out but she states the left knee feels like it might every once in a while. The left knee is stiff upon arising from sitting but not the right knee. It is not painful to go up and down stairs and sit for a prolonged time. The patient has not had a cortisone injection but she had the right knee aspirated on 02/18/19. The patient has not tried a lubrication injection. The patient has tried physical therapy after her right knee TKA but not recently. The patient has had surgery on the right knee and it was replaced on 03/15/2017 but she has not had the left knee replaced. Therefore we are 2 years and 10 months post op.  The patient states that the pain that she has in her right knee is a different kind of pain from the arthritic pain that she had before the surgery was done. She also states that she knows the left knee has arthritis in it and that is what is causing her pain. She is here today to figure out why her right knee still has minimal pain every once in a while during certain movements and why the left knee feels like it is going to give out every once in a while. Patient states that she loved to dance and she is just frustrated that she is unable to dance because of her knee pain. She states that she danced for the last 50 years of her life. Patient is in attendance today with her son. Review of Systems   Constitutional: Positive for activity change. Negative for appetite change, fatigue and fever. Respiratory: Negative for apnea, cough, chest tightness and shortness of breath. Cardiovascular: Negative for chest pain, palpitations and leg swelling. Gastrointestinal: Negative for abdominal distention, abdominal pain, constipation, diarrhea, nausea and vomiting. Genitourinary: Negative for difficulty urinating, dysuria and hematuria. Musculoskeletal: Positive for arthralgias. Negative for gait problem, joint swelling and myalgias. Skin: Negative for color change and rash. Neurological: Negative for dizziness, weakness, numbness and headaches. Psychiatric/Behavioral: Negative for sleep disturbance. Past Medical History:    No past medical history on file. Past Surgical History:    No past surgical history on file.     Current Medications:   Current Outpatient Medications   Medication Sig Dispense Refill    ibuprofen (ADVIL;MOTRIN) 800 MG tablet Take 1 tablet by mouth every 8 hours as needed for Pain 270 tablet 1    famotidine (PEPCID) 20 MG tablet Take 1 tablet by mouth 2 times daily 180 tablet 1    levothyroxine (SYNTHROID) 25 MCG tablet TAKE ONE TABLET BY MOUTH ONE TIME A DAY 90 tablet 3    ibuprofen-famotidine (DUEXIS) 800-26.6 MG TABS Take 1 tablet by mouth 3 times daily 90 tablet 5    her pain through the sensory nerves. The patient then stated that she understands the plan and at this time, she has opted for a referral to Dr. Samson Roth for a genicular block for the pain in her right knee. Patient will also continue moving and she will use her cane or walker to help her ambulate. Patient should return to the clinic PRN. The patient will call the office immediately with any problems. No orders of the defined types were placed in this encounter. Orders Placed This Encounter   Procedures    XR KNEE RIGHT (1-2 VIEWS)     Standing Status:   Future     Number of Occurrences:   1     Standing Expiration Date:   1/9/2021     Order Specific Question:   Reason for exam:     Answer:   chase knee pain   [address] Xanedr Mon MD, Pain Management, [place]     Referral Priority:   Routine     Referral Type:   Eval and Treat     Referral Reason:   Specialty Services Required     Referred to Provider:   Darrius Sosa MD     Requested Specialty:   Pain Management     Number of Visits Requested:   1     Hyun RILEY Day V, am scribing for and in the presence of Margarita Andrade D.O. 1/12/2020  9:06 PM        Margarita RILEY DO, have personally seen this patient and I have reviewed the CC, PMH, [address] [facility],[address] and Social History as provided by other clinical staff. I reassessed the HPI and ROS as scribed by Chasity Taylor in my presence and it is both accurate and complete. Thereafter, I personally performed the PE, reviewed the imaging and established the DX and POC. I agree with the documentation provided by the Medical Scribe. I have reviewed all documentation in its entirety prior to providing my signature indicating agreement. Any areas of disagreement are noted on the chart.     Electronically signed by Justin Shah DO on 1/12/2020 at 9:08 PM          Electronically signed by Justin Shah DO, on 1/12/2020 at 9:06 PM

## 2020-01-16 ENCOUNTER — INITIAL CONSULT (OUTPATIENT)
Dept: PAIN MANAGEMENT | Age: 85
End: 2020-01-16
Payer: COMMERCIAL

## 2020-01-16 VITALS
HEIGHT: 64 IN | WEIGHT: 143 LBS | BODY MASS INDEX: 24.41 KG/M2 | DIASTOLIC BLOOD PRESSURE: 78 MMHG | OXYGEN SATURATION: 97 % | SYSTOLIC BLOOD PRESSURE: 155 MMHG | HEART RATE: 76 BPM

## 2020-01-16 PROCEDURE — 99205 OFFICE O/P NEW HI 60 MIN: CPT | Performed by: ANESTHESIOLOGY

## 2020-01-16 RX ORDER — GABAPENTIN 100 MG/1
100 CAPSULE ORAL NIGHTLY
Qty: 30 CAPSULE | Refills: 0 | Status: SHIPPED | OUTPATIENT
Start: 2020-01-16 | End: 2020-02-25 | Stop reason: SDUPTHER

## 2020-01-16 RX ORDER — BIOTIN 10000 MCG
CAPSULE ORAL
COMMUNITY

## 2020-01-16 RX ORDER — VITAMIN B COMPLEX
1 CAPSULE ORAL DAILY
COMMUNITY

## 2020-01-16 ASSESSMENT — ENCOUNTER SYMPTOMS
EYES NEGATIVE: 1
SINUS PAIN: 1
ALLERGIC/IMMUNOLOGIC NEGATIVE: 1
GASTROINTESTINAL NEGATIVE: 1
RESPIRATORY NEGATIVE: 1

## 2020-01-16 NOTE — COMMUNICATION BODY
Assessment & Plan   Pain History    -55-year-old female with history of chronic knee pain and low back pain    Back pain  Back pain is chronic onset many years ago located in the lumbar area predominantly on the right side with radiation down right leg all the way to the foot  Describes the pain as aching throbbing stabbing burning sensation  Associated symptoms include right leg intermittent numbness and tingling  Report intermittent right leg weakness  Symptoms of progressively been worsening and affect ambulation and daily life activities  She denies any changes in bladder or bowel control  No history of fever chills or weight loss    No previous lumbar spine diagnostic work-up except a plain x-ray film few years ago  No previous lumbar spine injection history  No previous lumbar spine surgical history  Patient did physical therapy last year without any improvement in her symptoms  Pain score today  7    Knee pain  History of chronic right knee pain for which she was diagnosed with knee arthritis failed conservative measures and injections and had right total knee replacement surgery 2017  She continued to have pain in her right knee and have not been happy with the outcome    Chronic left knee pain  Onset many years ago have been progressively worsening particularly flared up 1 year ago  Reports instability in left knee with tendency to trip  Have not had any MRI of left knee  Pain is constant aggravated with any movement alleviates with rest    1. Chronic bilateral low back pain with right-sided sciatica    2. History of arthroplasty of right knee    3. Chronic pain of left knee    4.  Chronic instability of left knee        Worsening left knee pain and instability with tendency to fall  Clinical exam suggest knee instability  I think knee MRI is warranted    Worsening low back and right lower extremity pain with sciatica and numbness intermittent leg weakness and bladder incontinence history  I think MRI lumbar spine is required    Will order topical NSAIDs  We will start on low-dose gabapentin  To consider dose titration if tolerated in future    Orders Placed This Encounter   Procedures    MRI KNEE LEFT WO CONTRAST    MRI Lumbar Spine WO Contrast      Orders Placed This Encounter   Medications    diclofenac sodium (VOLTAREN) 1 % GEL     Sig: Apply 2 g topically 4 times daily     Dispense:  5 Tube     Refill:  1    gabapentin (NEURONTIN) 100 MG capsule     Sig: Take 1 capsule by mouth nightly for 30 days.      Dispense:  30 capsule     Refill:  0

## 2020-01-16 NOTE — PROGRESS NOTES
The patient is a 80 y. o. Non-/non  female. Chief Complaint   Patient presents with    Consultation    Knee Pain        HPI    Requesting physician for the evaluation of [place] 4/2/1932: Dr. Greenberg Eth    Pain History    -55-year-old female with history of chronic knee pain and low back pain    Back pain  Back pain is chronic onset many years ago located in the lumbar area predominantly on the right side with radiation down right leg all the way to the foot  Describes the pain as aching throbbing stabbing burning sensation  Associated symptoms include right leg intermittent numbness and tingling  Report intermittent right leg weakness  Symptoms of progressively been worsening and affect ambulation and daily life activities  She denies any changes in bladder or bowel control  No history of fever chills or weight loss    No previous lumbar spine diagnostic work-up except a plain x-ray film few years ago  No previous lumbar spine injection history  No previous lumbar spine surgical history  Patient did physical therapy last year without any improvement in her symptoms  Pain score today  7    Knee pain  History of chronic right knee pain for which she was diagnosed with knee arthritis failed conservative measures and injections and had right total knee replacement surgery 2017  She continued to have pain in her right knee and have not been happy with the outcome    Chronic left knee pain  Onset many years ago have been progressively worsening particularly flared up 1 year ago  Reports instability in left knee with tendency to trip  Have not had any MRI of left knee  Pain is constant aggravated with any movement alleviates with rest    1. Location:left knee  2. Radiation:calf  3. Character:nagging, numb, aching, sharp  5. Duration:July 2019   6. Onset: months  7. Did an injury cause pain: no  8. Aggravating factors:walking  9. Alleviating factors:sitting  10.  Associated symptoms (numbness / tingling / weakness):  yes  -Where at:right foot and heel  -Down into finger tips or toes (specify which finger or toes):toes  -constant or intermitting: constant  11. Red Flags: (weight loss / chills / loss of bladder or bowel control):loss of bladder and bowel control    Previous management history  1. Previous diagnostic workup: (Imaging/EMG)   CT, MRI, or Xray:xray  What part of the body:left knee  What facility did they have it at:Dr. [facility]  What year or specific date: :2014 and 2019  EMG:  no    2. Previous non interventional treatments tried:  chiropractor or physical therapy: physical therapy for right knee s/p knee replacement not on left knee  What part of the body:right knee  What facility [facility] done [facility]  How long ago was it last tried:year  Did it work: No  Did they complete it:Yes    3. Previous Medications tried  NSAID's: yes  Neurontin: no  Lyrica: no  Trycyclic antidepressant (Ellavil / Pamelor ): no  Cymbalta: no  Opioids (Ultram / Vicodin / Percocet / Morphine / Dilaudid / Oramorph/ Fentanyl etc.):Cannot take narcotics  Last Pain medication taken (name of med and date): Ibuprofen last taken this morning    4. Previous Interventional pain procedures tried:  What kind of injection:n/a  Who did the injection: n/a  did the injection help: n/a  Last time injection was done:N/A    5.  Previous surgeries for pain  What part of the body did they have the surgery:n/a  What physician did the surgery:n/a  What Facility did they have the surgery done:n/a  Date of Surgery:N/A    Social History:  Marital status:  Employment History:[employer]  Working  No  Full time Or Part time: [employer]  Disability  No   Legal Issues related to pain complaint: No     Informant: patient      Social History     Socioeconomic History    Marital status: Single     Spouse name: None    Number of children: None    Years of education: None    Highest education level: None   Occupational History    1    levothyroxine (SYNTHROID) 25 MCG tablet TAKE ONE TABLET BY MOUTH ONE TIME A DAY 90 tablet 3    tobramycin (TOBREX) 0.3 % ophthalmic solution 2 drops each eye TID 1 Bottle 0    lisinopril (PRINIVIL;ZESTRIL) 5 MG tablet Take 5 mg by mouth 2 times daily.  aspirin 81 MG tablet Take 81 mg by mouth daily.  ibuprofen-famotidine (DUEXIS) 800-26.6 MG TABS Take 1 tablet by mouth 3 times daily (Patient not taking: Reported on 1/16/2020) 90 tablet 5    rosuvastatin (CRESTOR) 10 MG tablet Take 10 mg by mouth daily      diclofenac (VOLTAREN) 75 MG EC tablet Take 1 tablet by mouth 2 times daily (with meals) (Patient not taking: Reported on 1/16/2020) 60 tablet 11    multivitamin (THERAGRAN) per tablet Take 1 tablet by mouth daily. No current facility-administered medications for this visit. Review of Systems   Constitutional: Negative. Negative for fever. HENT: Positive for sinus pain. Eyes: Negative. Respiratory: Negative. Cardiovascular: Negative. Gastrointestinal: Negative. Endocrine: Negative. Genitourinary: Positive for difficulty urinating. Musculoskeletal: Positive for arthralgias, joint swelling and myalgias. Skin: Negative. Allergic/Immunologic: Negative. Neurological: Positive for weakness and numbness. Psychiatric/Behavioral: Negative. All other systems reviewed and are negative. Objective:  General Appearance:  Uncomfortable, in pain, well-appearing and in no acute distress. Vital signs: (most recent): Blood pressure (!) 155/78, pulse 76, height 5' 4\" (1.626 m), weight 143 lb (64.9 kg), SpO2 97 %, not currently breastfeeding. Vital signs are normal.  No fever. Output: Producing urine and producing stool. HEENT: Normal HEENT exam.    Lungs:  Normal effort and normal respiratory rate. Breath sounds clear to auscultation. She is not in respiratory distress. Heart: Normal rate. Regular rhythm. No murmur.    Chest: Symmetric chest wall

## 2020-01-16 NOTE — LETTER
Reports instability in left knee with tendency to trip  Have not had any MRI of left knee  Pain is constant aggravated with any movement alleviates with rest    1. Chronic bilateral low back pain with right-sided sciatica    2. History of arthroplasty of right knee    3. Chronic pain of left knee    4. Chronic instability of left knee        Worsening left knee pain and instability with tendency to fall  Clinical exam suggest knee instability  I think knee MRI is warranted    Worsening low back and right lower extremity pain with sciatica and numbness intermittent leg weakness and bladder incontinence history  I think MRI lumbar spine is required    Will order topical NSAIDs  We will start on low-dose gabapentin  To consider dose titration if tolerated in future    Orders Placed This Encounter   Procedures    MRI KNEE LEFT WO CONTRAST    MRI Lumbar Spine WO Contrast      Orders Placed This Encounter   Medications    diclofenac sodium (VOLTAREN) 1 % GEL     Sig: Apply 2 g topically 4 times daily     Dispense:  5 Tube     Refill:  1    gabapentin (NEURONTIN) 100 MG capsule     Sig: Take 1 capsule by mouth nightly for 30 days. Dispense:  30 capsule     Refill:  0         If you have questions, please do not hesitate to call me. I look forward to following [place] along with you.     Sincerely,        Morris Marquez MD

## 2020-01-20 RX ORDER — DICLOFENAC SODIUM 75 MG/1
75 TABLET, DELAYED RELEASE ORAL 2 TIMES DAILY WITH MEALS
Qty: 60 TABLET | Refills: 11 | Status: SHIPPED | OUTPATIENT
Start: 2020-01-20

## 2020-02-03 ENCOUNTER — TELEPHONE (OUTPATIENT)
Dept: PAIN MANAGEMENT | Age: 85
End: 2020-02-03

## 2020-02-03 NOTE — TELEPHONE ENCOUNTER
[employer] has denied MRI of left knee due to not meeting criteria. Peer to Peer can be done by calling GILLIAN Arredondo at [employer] at [phone].

## 2020-02-25 ENCOUNTER — TELEPHONE (OUTPATIENT)
Dept: PAIN MANAGEMENT | Age: 85
End: 2020-02-25

## 2020-02-25 ENCOUNTER — OFFICE VISIT (OUTPATIENT)
Dept: PAIN MANAGEMENT | Age: 85
End: 2020-02-25
Payer: COMMERCIAL

## 2020-02-25 VITALS
HEIGHT: 64 IN | WEIGHT: 143 LBS | HEART RATE: 72 BPM | OXYGEN SATURATION: 98 % | SYSTOLIC BLOOD PRESSURE: 150 MMHG | BODY MASS INDEX: 24.41 KG/M2 | DIASTOLIC BLOOD PRESSURE: 73 MMHG

## 2020-02-25 PROBLEM — G89.29 CHRONIC KNEE PAIN AFTER TOTAL REPLACEMENT OF RIGHT KNEE JOINT: Status: ACTIVE | Noted: 2020-02-25

## 2020-02-25 PROBLEM — M23.52 CHRONIC INSTABILITY OF LEFT KNEE: Status: ACTIVE | Noted: 2020-02-25

## 2020-02-25 PROBLEM — G89.29 CHRONIC BILATERAL LOW BACK PAIN WITH RIGHT-SIDED SCIATICA: Status: ACTIVE | Noted: 2020-02-25

## 2020-02-25 PROBLEM — Z96.651 CHRONIC KNEE PAIN AFTER TOTAL REPLACEMENT OF RIGHT KNEE JOINT: Status: ACTIVE | Noted: 2020-02-25

## 2020-02-25 PROBLEM — M48.062 SPINAL STENOSIS OF LUMBAR REGION WITH NEUROGENIC CLAUDICATION: Status: ACTIVE | Noted: 2020-02-25

## 2020-02-25 PROBLEM — M25.561 CHRONIC KNEE PAIN AFTER TOTAL REPLACEMENT OF RIGHT KNEE JOINT: Status: ACTIVE | Noted: 2020-02-25

## 2020-02-25 PROBLEM — M25.562 CHRONIC PAIN OF LEFT KNEE: Status: ACTIVE | Noted: 2020-02-25

## 2020-02-25 PROBLEM — M54.41 CHRONIC BILATERAL LOW BACK PAIN WITH RIGHT-SIDED SCIATICA: Status: ACTIVE | Noted: 2020-02-25

## 2020-02-25 PROBLEM — G89.29 CHRONIC PAIN OF LEFT KNEE: Status: ACTIVE | Noted: 2020-02-25

## 2020-02-25 PROCEDURE — 99215 OFFICE O/P EST HI 40 MIN: CPT | Performed by: ANESTHESIOLOGY

## 2020-02-25 RX ORDER — GABAPENTIN 100 MG/1
200 CAPSULE ORAL NIGHTLY
Qty: 60 CAPSULE | Refills: 1 | Status: SHIPPED | OUTPATIENT
Start: 2020-02-25 | End: 2020-04-14 | Stop reason: SDUPTHER

## 2020-02-25 RX ORDER — DICLOFENAC SODIUM 75 MG/1
75 TABLET, DELAYED RELEASE ORAL 2 TIMES DAILY WITH MEALS
Qty: 60 TABLET | Refills: 11 | Status: CANCELLED | OUTPATIENT
Start: 2020-02-25

## 2020-02-25 RX ORDER — LOVASTATIN 40 MG/1
40 TABLET ORAL
COMMUNITY
Start: 2020-01-15

## 2020-02-25 ASSESSMENT — ENCOUNTER SYMPTOMS
DIARRHEA: 0
CONSTIPATION: 1
CHEST TIGHTNESS: 0
BACK PAIN: 1
SHORTNESS OF BREATH: 0

## 2020-02-25 NOTE — TELEPHONE ENCOUNTER
Received a call back from Briana Coates at [facility] and finalized scheduling pt procedure for Tuesday 3/10/2020 with IV sedation and xray Fluoro. Briana Coates provided Radha Moss with case ID # [id number].

## 2020-02-25 NOTE — PROGRESS NOTES
0.3 % ophthalmic solution 2 drops each eye TID 1 Bottle 0    lisinopril (PRINIVIL;ZESTRIL) 5 MG tablet Take 5 mg by mouth 2 times daily.  aspirin 81 MG tablet Take 81 mg by mouth daily.  multivitamin (THERAGRAN) per tablet Take 1 tablet by mouth daily. No current facility-administered medications for this visit. Review of Systems   Constitutional: Negative for chills and fever. Respiratory: Negative for chest tightness and shortness of breath. Gastrointestinal: Positive for constipation. Negative for diarrhea. Genitourinary: Negative for difficulty urinating. Musculoskeletal: Positive for back pain. Neurological: Negative for dizziness, light-headedness and headaches. Psychiatric/Behavioral: The patient is nervous/anxious. All other systems reviewed and are negative. Objective:  Vital signs: (most recent): Blood pressure (!) 150/73, pulse 72, height 5' 4\" (1.626 m), weight 143 lb (64.9 kg), SpO2 98 %, not currently breastfeeding. No fever.        Assessment & Plan       Alignment:3 mm anterolisthesis L4 on L5  Disc spaces:Mild disc space narrowing L3-L4.  Moderate narrowing L1-L2.  Severe disc space narrowing L5-S1  Vertebral bodies:Mild endplate degenerative changes L4 L5 S1  Cord:L1-L2 Conus  Paraspinous tissues:Normal   T12-L1: No canal stenosis.  No foraminal narrowing   L1-L2: Posterior disc bulge plus facet hypertrophy.  No canal stenosis.  Mild bilateral foraminal narrowing  L2-L3:Facet hypertrophy.  Posterior disc bulge.  Mild canal stenosis.  Mild left foraminal narrowing  L3-L4:Severe facet and ligamentous hypertrophy and posterior disc bulge.  Severe canal stenosis with severe recess stenosis.  Mild to moderate right and mild to moderate left foraminal narrowing  L4-L5:Severe facet and ligamentous hypertrophy and posterior disc bulge.  Severe canal stenosis with recess stenosis.  Mild left and mild to moderate right foraminal narrowing  L5-H1geymi and ligamentous hypertrophy and posterior disc bulge.  Mild canal stenosis.  Mild bilateral foraminal narrowing. Incidental note is made of multiple small sacral nerve root sheath cysts      IMPRESSION:  Multilevel degenerative changes throughout the lumbar spine most pronounced at L3-L4 and L4-L5 where there is severe canal stenosis    MRI lumbar spine 02/2020  Alignment:3 mm anterolisthesis L4 on L5  Disc spaces:Mild disc space narrowing L3-L4.  Moderate narrowing L1-L2.  Severe disc space narrowing L5-S1  Vertebral bodies:Mild endplate degenerative changes L4 L5 S1  Cord:L1-L2 Conus  Paraspinous tissues:Normal   T12-L1: No canal stenosis.  No foraminal narrowing   L1-L2: Posterior disc bulge plus facet hypertrophy.  No canal stenosis.  Mild bilateral foraminal narrowing  L2-L3:Facet hypertrophy.  Posterior disc bulge.  Mild canal stenosis.  Mild left foraminal narrowing  L3-L4:Severe facet and ligamentous hypertrophy and posterior disc bulge.  Severe canal stenosis with severe recess stenosis.  Mild to moderate right and mild to moderate left foraminal narrowing  L4-L5:Severe facet and ligamentous hypertrophy and posterior disc bulge.  Severe canal stenosis with recess stenosis.  Mild left and mild to moderate right foraminal narrowing  L5-V3xytas and ligamentous hypertrophy and posterior disc bulge.  Mild canal stenosis.  Mild bilateral foraminal narrowing. Incidental note is made of multiple small sacral nerve root sheath cysts      IMPRESSION:  Multilevel degenerative changes throughout the lumbar spine most pronounced at L3-L4 and L4-L5 where there is severe canal stenosis    1. Spinal stenosis of lumbar region with neurogenic claudication    2. Chronic bilateral low back pain with right-sided sciatica    3. Chronic knee pain after total replacement of right knee joint    4. Chronic pain of left knee    5. Chronic instability of left knee    6.  Spondylosis of lumbar region without myelopathy or radiculopathy

## 2020-03-23 ENCOUNTER — TELEPHONE (OUTPATIENT)
Dept: PAIN MANAGEMENT | Age: 85
End: 2020-03-23

## 2020-03-23 NOTE — TELEPHONE ENCOUNTER
She is currently taking gabapentin at bedtime, she is having trouble getting the My chart to work. She is signed up. We will call at later date to help her.

## 2020-04-14 ENCOUNTER — TELEMEDICINE (OUTPATIENT)
Dept: PAIN MANAGEMENT | Age: 85
End: 2020-04-14
Payer: COMMERCIAL

## 2020-04-14 ENCOUNTER — TELEPHONE (OUTPATIENT)
Dept: PAIN MANAGEMENT | Age: 85
End: 2020-04-14

## 2020-04-14 VITALS — BODY MASS INDEX: 24.41 KG/M2 | WEIGHT: 143 LBS | HEIGHT: 64 IN

## 2020-04-14 PROCEDURE — 99214 OFFICE O/P EST MOD 30 MIN: CPT | Performed by: ANESTHESIOLOGY

## 2020-04-14 RX ORDER — TRAMADOL HYDROCHLORIDE 50 MG/1
50 TABLET ORAL DAILY PRN
Qty: 30 TABLET | Refills: 0 | Status: SHIPPED | OUTPATIENT
Start: 2020-04-14 | End: 2020-05-14

## 2020-04-14 RX ORDER — ONDANSETRON 4 MG/1
4 TABLET, FILM COATED ORAL DAILY PRN
Qty: 30 TABLET | Refills: 0 | Status: SHIPPED | OUTPATIENT
Start: 2020-04-14

## 2020-04-14 RX ORDER — GABAPENTIN 100 MG/1
200 CAPSULE ORAL NIGHTLY
Qty: 60 CAPSULE | Refills: 1 | Status: SHIPPED | OUTPATIENT
Start: 2020-04-14 | End: 2020-05-14

## 2020-04-14 ASSESSMENT — ENCOUNTER SYMPTOMS
BACK PAIN: 1
RESPIRATORY NEGATIVE: 1

## 2020-04-14 NOTE — PROGRESS NOTES
None   Lifestyle    Physical activity     Days per week: None     Minutes per session: None    Stress: None   Relationships    Social connections     Talks on phone: None     Gets together: None     Attends [employer] service: None     Active member of club or organization: None     Attends meetings of clubs or organizations: None     Relationship status: None    Intimate partner violence     Fear of current or ex partner: None     Emotionally abused: None     Physically abused: None     Forced sexual activity: None   Other Topics Concern    None   Social History Narrative    None     History reviewed. No pertinent family history. Allergies   Allergen Reactions    Penicillins Nausea Only    Bactrim [Sulfamethoxazole-Trimethoprim] Rash    Sulfa Antibiotics Rash     Penicillins; Bactrim [sulfamethoxazole-trimethoprim]; and Sulfa antibiotics   There were no vitals filed for this visit. Current Outpatient Medications   Medication Sig Dispense Refill    gabapentin (NEURONTIN) 100 MG capsule Take 2 capsules by mouth nightly for 30 days. 60 capsule 1    traMADol (ULTRAM) 50 MG tablet Take 1 tablet by mouth daily as needed for Pain for up to 30 days.  30 tablet 0    ondansetron (ZOFRAN) 4 MG tablet Take 1 tablet by mouth daily as needed for Nausea or Vomiting 30 tablet 0    lovastatin (MEVACOR) 40 MG tablet 40 mg      Elastic Bandages & Supports (KNEE BRACE) MISC 1 Units by Does not apply route daily 1 each 0    diclofenac (VOLTAREN) 75 MG EC tablet Take 1 tablet by mouth 2 times daily (with meals) 60 tablet 11    Biotin 10 MG CAPS Take by mouth      b complex vitamins capsule Take 1 capsule by mouth daily      diclofenac sodium (VOLTAREN) 1 % GEL Apply 2 g topically 4 times daily 5 Tube 1    ibuprofen (ADVIL;MOTRIN) 800 MG tablet Take 1 tablet by mouth every 8 hours as needed for Pain 270 tablet 1    famotidine (PEPCID) 20 MG tablet Take 1 tablet by mouth 2 times daily 180 tablet 1    levothyroxine

## 2020-05-13 ENCOUNTER — TELEMEDICINE (OUTPATIENT)
Dept: PAIN MANAGEMENT | Age: 85
End: 2020-05-13
Payer: COMMERCIAL

## 2020-05-13 ENCOUNTER — TELEPHONE (OUTPATIENT)
Dept: PAIN MANAGEMENT | Age: 85
End: 2020-05-13

## 2020-05-13 VITALS — WEIGHT: 142 LBS | BODY MASS INDEX: 24.24 KG/M2 | HEIGHT: 64 IN

## 2020-05-13 PROCEDURE — 99213 OFFICE O/P EST LOW 20 MIN: CPT | Performed by: ANESTHESIOLOGY

## 2020-05-13 ASSESSMENT — ENCOUNTER SYMPTOMS
BACK PAIN: 1
RESPIRATORY NEGATIVE: 1

## 2020-06-15 ENCOUNTER — TELEPHONE (OUTPATIENT)
Dept: PAIN MANAGEMENT | Age: 85
End: 2020-06-15

## 2020-06-15 NOTE — TELEPHONE ENCOUNTER
Pt SCS approved by insurance company to be completed at [facility]. Please advise when to schedule procedure.  Approval scanned into [employer]

## 2020-06-19 ENCOUNTER — TELEPHONE (OUTPATIENT)
Dept: PAIN MANAGEMENT | Age: 85
End: 2020-06-19

## 2020-06-19 NOTE — TELEPHONE ENCOUNTER
LVM requesting pt to call the office back to schedule SCS trial on 6/26/20.  Need to confirm if pt is going through [facility] as well

## 2020-06-22 ENCOUNTER — HOSPITAL ENCOUNTER (OUTPATIENT)
Dept: PREADMISSION TESTING | Age: 85
Setting detail: SPECIMEN
Discharge: HOME | End: 2020-06-26
Payer: COMMERCIAL

## 2020-06-22 PROCEDURE — U0003 INFECTIOUS AGENT DETECTION BY NUCLEIC ACID (DNA OR RNA); SEVERE ACUTE RESPIRATORY SYNDROME CORONAVIRUS 2 (SARS-COV-2) (CORONAVIRUS DISEASE [COVID-19]), AMPLIFIED PROBE TECHNIQUE, MAKING USE OF HIGH THROUGHPUT TECHNOLOGIES AS DESCRIBED BY CMS-2020-01-R: HCPCS

## 2020-06-23 ENCOUNTER — TELEPHONE (OUTPATIENT)
Dept: PAIN MANAGEMENT | Age: 85
End: 2020-06-23

## 2020-06-23 NOTE — TELEPHONE ENCOUNTER
Patient called stating that someone from [facility] called and told her that  will not pay from her procedure because she has [employer]. She states some one call to [place] and they verified this information.      Writer called [facility] in [address] [place] [address], she called the insurance company and they did tell her even though we have a PA for procedure it has to be done at a different hospital.

## 2020-06-25 LAB — SARS-COV-2, NAA: NOT DETECTED

## 2020-07-29 ENCOUNTER — TELEPHONE (OUTPATIENT)
Dept: PAIN MANAGEMENT | Age: 85
End: 2020-07-29

## 2020-07-29 NOTE — TELEPHONE ENCOUNTER
Pt called stating she was going to switch doctors and is unhappy that our staff/ Dr. Rollins Gist has not reached out to her since he procedure was cancelled due to insurance not covering it. She requested her last two notes and imaging from prior appointments be faxed elsewhere.

## 2023-03-28 ENCOUNTER — HOSPITAL ENCOUNTER (OUTPATIENT)
Age: 88
Discharge: HOME | End: 2023-03-28
Payer: COMMERCIAL

## 2023-03-28 LAB
ALT SERPL-CCNC: 12 U/L (ref 5–33)
AST SERPL-CCNC: 17 U/L
CHOLEST SERPL-MCNC: 202 MG/DL
CHOLESTEROL/HDL RATIO: 2.5
HDLC SERPL-MCNC: 82 MG/DL
LDLC SERPL CALC-MCNC: 101 MG/DL (ref 0–130)
TRIGL SERPL-MCNC: 95 MG/DL

## 2023-03-28 PROCEDURE — 36415 COLL VENOUS BLD VENIPUNCTURE: CPT

## 2023-03-28 PROCEDURE — 84450 TRANSFERASE (AST) (SGOT): CPT

## 2023-03-28 PROCEDURE — 80061 LIPID PANEL: CPT

## 2023-03-28 PROCEDURE — 84460 ALANINE AMINO (ALT) (SGPT): CPT

## 2023-09-15 ENCOUNTER — HOSPITAL ENCOUNTER (OUTPATIENT)
Age: 88
Discharge: HOME | End: 2023-09-15
Payer: COMMERCIAL

## 2023-09-15 PROCEDURE — 36415 COLL VENOUS BLD VENIPUNCTURE: CPT

## 2023-09-15 PROCEDURE — 84443 ASSAY THYROID STIM HORMONE: CPT

## 2023-09-15 PROCEDURE — 84439 ASSAY OF FREE THYROXINE: CPT

## 2023-09-16 LAB
T4 FREE SERPL-MCNC: 1.2 NG/DL (ref 0.9–1.7)
TSH SERPL DL<=0.05 MIU/L-ACNC: 0.72 UIU/ML (ref 0.3–5)